# Patient Record
Sex: MALE | Race: OTHER | HISPANIC OR LATINO
[De-identification: names, ages, dates, MRNs, and addresses within clinical notes are randomized per-mention and may not be internally consistent; named-entity substitution may affect disease eponyms.]

---

## 2020-07-07 ENCOUNTER — APPOINTMENT (OUTPATIENT)
Dept: FAMILY MEDICINE | Facility: CLINIC | Age: 40
End: 2020-07-07

## 2022-02-01 ENCOUNTER — APPOINTMENT (OUTPATIENT)
Dept: FAMILY MEDICINE | Facility: CLINIC | Age: 42
End: 2022-02-01
Payer: COMMERCIAL

## 2022-02-01 VITALS
OXYGEN SATURATION: 98 % | TEMPERATURE: 97.9 F | RESPIRATION RATE: 12 BRPM | SYSTOLIC BLOOD PRESSURE: 120 MMHG | BODY MASS INDEX: 26.99 KG/M2 | DIASTOLIC BLOOD PRESSURE: 80 MMHG | HEART RATE: 82 BPM | HEIGHT: 65 IN | WEIGHT: 162 LBS

## 2022-02-01 PROCEDURE — 99386 PREV VISIT NEW AGE 40-64: CPT

## 2022-02-01 NOTE — COUNSELING
[Fall prevention counseling provided] : Fall prevention counseling provided [Adequate lighting] : Adequate lighting [No throw rugs] : No throw rugs [Use proper foot wear] : Use proper foot wear [Behavioral health counseling provided] : Behavioral health counseling provided [Sleep ___ hours/day] : Sleep [unfilled] hours/day [Engage in a relaxing activity] : Engage in a relaxing activity [Plan in advance] : Plan in advance [AUDIT-C Screening administered and reviewed] : AUDIT-C Screening administered and reviewed [Encouraged to increase physical activity] : Encouraged to increase physical activity [____ min/wk Activity] : [unfilled] min/wk activity [None] : None [Good understanding] : Patient has a good understanding of lifestyle changes and steps needed to achieve self management goal

## 2022-02-01 NOTE — HISTORY OF PRESENT ILLNESS
[FreeTextEntry1] : establish care [de-identified] : This is a 43 y/o male with PMHx significant for chronic migraines, GERD, SBO x 2 with 3 EGD with dilatation (GSH X 2 and last in hospital in [place] 2 years ago) presenting to establish as a new patient in the practice and for CPE. Pt was seeing Dr Ibrahim up to a couple of months ago. Pt request medication refills, states that still has headaches daily for which he takes Esgic.

## 2022-02-01 NOTE — HEALTH RISK ASSESSMENT
[Good] : ~his/her~  mood as  good [Never] : Never [No] : In the past 12 months have you used drugs other than those required for medical reasons? No [No falls in past year] : Patient reported no falls in the past year [0] : 2) Feeling down, depressed, or hopeless: Not at all (0) [PHQ-2 Negative - No further assessment needed] : PHQ-2 Negative - No further assessment needed [HIV Test offered] : HIV Test offered [Hepatitis C test offered] : Hepatitis C test offered [None] : None [With Family] : lives with family [# of Members in Household ___] :  household currently consist of [unfilled] member(s) [Employed] : employed [] :  [# Of Children ___] : has [unfilled] children [Sexually Active] : sexually active [Feels Safe at Home] : Feels safe at home [Fully functional (bathing, dressing, toileting, transferring, walking, feeding)] : Fully functional (bathing, dressing, toileting, transferring, walking, feeding) [Fully functional (using the telephone, shopping, preparing meals, housekeeping, doing laundry, using] : Fully functional and needs no help or supervision to perform IADLs (using the telephone, shopping, preparing meals, housekeeping, doing laundry, using transportation, managing medications and managing finances) [Smoke Detector] : smoke detector [Carbon Monoxide Detector] : carbon monoxide detector [Seat Belt] :  uses seat belt [With Patient/Caregiver] : , with patient/caregiver [Aggressive treatment] : aggressive treatment [Audit-CScore] : 0 [de-identified] : none [de-identified] : regular [FDK2Irfkx] : 0 [Change in mental status noted] : No change in mental status noted [Language] : denies difficulty with language [Behavior] : denies difficulty with behavior [Learning/Retaining New Information] : denies difficulty learning/retaining new information [Handling Complex Tasks] : denies difficulty handling complex tasks [Reasoning] : denies difficulty with reasoning [Spatial Ability and Orientation] : denies difficulty with spatial ability and orientation [High Risk Behavior] : no high risk behavior [Reports changes in hearing] : Reports no changes in hearing [Reports changes in vision] : Reports no changes in vision [Reports changes in dental health] : Reports no changes in dental health [Guns at Home] : no guns at home [Sunscreen] : does not use sunscreen [de-identified] : Full time [FreeTextEntry2] : [employer] [AdvancecareDate] : 01/22

## 2022-02-01 NOTE — ASSESSMENT
[FreeTextEntry1] : This is a 43 y/o male with PMHx significant for chronic migraines, GERD, SBO x 2 with 3 EGD with dilatation (GSH X 3 with last performed at a hospital in [place] 2 years ago) presenting to establish as a new patient in the practice and for CPE. \par \par NEURO: Chronic migraines\par -Continue Esgic, I-STOP reviewed, Reference #: [id number], postdated for 2/15/22\par -Neurology referral with Dr Wood\par \par GI: h/o GERD, h/o SBO\par -Resolved, continue dietary changes\par -Pt cannot recall hospital name or GI for records\par \par HCM: \par -Declined Flu vacine\par -Covid vaccine MODERNA 7/28/21, 8/25/21\par -Fasting labs as outpt\par -Hep C / HIV testing verbally consented.\par \par \par \par \par \par \par

## 2022-03-15 LAB
25(OH)D3 SERPL-MCNC: 6.2 NG/ML
ALBUMIN SERPL ELPH-MCNC: 5.1 G/DL
ALP BLD-CCNC: 105 U/L
ALT SERPL-CCNC: 34 U/L
ANION GAP SERPL CALC-SCNC: 16 MMOL/L
APPEARANCE: CLEAR
AST SERPL-CCNC: 30 U/L
BACTERIA: NEGATIVE
BASOPHILS # BLD AUTO: 0.04 K/UL
BASOPHILS NFR BLD AUTO: 0.9 %
BILIRUB SERPL-MCNC: 0.2 MG/DL
BILIRUBIN URINE: NEGATIVE
BLOOD URINE: NEGATIVE
BUN SERPL-MCNC: 12 MG/DL
CALCIUM SERPL-MCNC: 10.3 MG/DL
CHLORIDE SERPL-SCNC: 99 MMOL/L
CHOLEST SERPL-MCNC: 254 MG/DL
CO2 SERPL-SCNC: 24 MMOL/L
COLOR: YELLOW
COMPREHENSIVE SCREEN URINE: NORMAL
CREAT SERPL-MCNC: 0.75 MG/DL
EOSINOPHIL # BLD AUTO: 0.33 K/UL
EOSINOPHIL NFR BLD AUTO: 7.1 %
GLUCOSE QUALITATIVE U: NEGATIVE
GLUCOSE SERPL-MCNC: 102 MG/DL
HCT VFR BLD CALC: 47.9 %
HCV AB SER QL: NONREACTIVE
HCV S/CO RATIO: 0.1 S/CO
HDLC SERPL-MCNC: 94 MG/DL
HGB BLD-MCNC: 15.6 G/DL
HIV1+2 AB SPEC QL IA.RAPID: NONREACTIVE
HYALINE CASTS: 0 /LPF
IMM GRANULOCYTES NFR BLD AUTO: 0.2 %
KETONES URINE: NEGATIVE
LDLC SERPL CALC-MCNC: 131 MG/DL
LEUKOCYTE ESTERASE URINE: NEGATIVE
LYMPHOCYTES # BLD AUTO: 1.94 K/UL
LYMPHOCYTES NFR BLD AUTO: 41.6 %
MAN DIFF?: NORMAL
MCHC RBC-ENTMCNC: 30.8 PG
MCHC RBC-ENTMCNC: 32.6 GM/DL
MCV RBC AUTO: 94.7 FL
MICROSCOPIC-UA: NORMAL
MONOCYTES # BLD AUTO: 0.64 K/UL
MONOCYTES NFR BLD AUTO: 13.7 %
NEUTROPHILS # BLD AUTO: 1.7 K/UL
NEUTROPHILS NFR BLD AUTO: 36.5 %
NITRITE URINE: NEGATIVE
NONHDLC SERPL-MCNC: 160 MG/DL
PH URINE: 6.5
PLATELET # BLD AUTO: 431 K/UL
POTASSIUM SERPL-SCNC: 4.5 MMOL/L
PROT SERPL-MCNC: 7.5 G/DL
PROTEIN URINE: NORMAL
PSA SERPL-MCNC: 0.99 NG/ML
RBC # BLD: 5.06 M/UL
RBC # FLD: 12.3 %
RED BLOOD CELLS URINE: 2 /HPF
SODIUM SERPL-SCNC: 139 MMOL/L
SPECIFIC GRAVITY URINE: 1.03
SQUAMOUS EPITHELIAL CELLS: 0 /HPF
TRIGL SERPL-MCNC: 143 MG/DL
TSH SERPL-ACNC: 1.34 UIU/ML
UROBILINOGEN URINE: NORMAL
WBC # FLD AUTO: 4.66 K/UL
WHITE BLOOD CELLS URINE: 0 /HPF

## 2022-04-12 ENCOUNTER — APPOINTMENT (OUTPATIENT)
Dept: FAMILY MEDICINE | Facility: CLINIC | Age: 42
End: 2022-04-12
Payer: COMMERCIAL

## 2022-04-12 ENCOUNTER — LABORATORY RESULT (OUTPATIENT)
Age: 42
End: 2022-04-12

## 2022-04-12 VITALS
RESPIRATION RATE: 12 BRPM | WEIGHT: 140 LBS | HEIGHT: 65 IN | SYSTOLIC BLOOD PRESSURE: 126 MMHG | OXYGEN SATURATION: 98 % | BODY MASS INDEX: 23.32 KG/M2 | HEART RATE: 85 BPM | TEMPERATURE: 98.2 F | DIASTOLIC BLOOD PRESSURE: 80 MMHG

## 2022-04-12 PROCEDURE — 36415 COLL VENOUS BLD VENIPUNCTURE: CPT

## 2022-04-12 PROCEDURE — 99214 OFFICE O/P EST MOD 30 MIN: CPT | Mod: 25

## 2022-04-12 NOTE — HISTORY OF PRESENT ILLNESS
[FreeTextEntry1] : medication refills. [de-identified] : This is a 41 y/o male with PMHx significant for chronic migraines, GERD, SBO x 2 with 3 EGD with dilatation (GSH X 2 and last in hospital in [place] 2 years ago) presenting to establish as a new patient in the practice and for CPE. Pt was seeing Dr Ibrahim up to a couple of months ago. Pt request medication refills, states that still has headaches daily for which he takes Esgic.

## 2022-04-12 NOTE — ASSESSMENT
[FreeTextEntry1] : This is a 41 y/o male with PMHx significant for chronic migraines, GERD, SBO x 2 with 3 EGD with dilatation (GSH X 3 with last performed at a hospital in [place] 2 years ago) presenting for medication refills.\par \par NEURO: Chronic migraines\par -Continue Esgic, I-STOP reviewed, Reference #: [id number].\par -Neurology referral with Dr Wood, has appointment in 2 days\par \par GI: h/o GERD, h/o SBO\par -Resolved, continue dietary changes\par -Sucralfate prn\par -Pt cannot recall hospital name or GI for records\par \par CVS: HLD\par -Check lipids\par \par F/E/N: Vitamin D Deficiency\par -Continue Vitamin D supplements\par -Check Vitamin D level today in house\par \par HCM: \par -Declined Flu vaccine\par -Covid vaccine MODERNA 7/28/21, 8/25/21\par -Blood drawn in house today\par -Hep C / HIV testing non reactive

## 2022-04-12 NOTE — HEALTH RISK ASSESSMENT
[Never] : Never [No] : In the past 12 months have you used drugs other than those required for medical reasons? No [No falls in past year] : Patient reported no falls in the past year [0] : 2) Feeling down, depressed, or hopeless: Not at all (0) [PHQ-2 Negative - No further assessment needed] : PHQ-2 Negative - No further assessment needed [With Patient/Caregiver] : , with patient/caregiver [Aggressive treatment] : aggressive treatment [Audit-CScore] : 0 [de-identified] : none [de-identified] : regular [[id number]] : 0 [AdvancecareDate] : 01/22

## 2022-04-14 ENCOUNTER — APPOINTMENT (OUTPATIENT)
Dept: NEUROLOGY | Facility: CLINIC | Age: 42
End: 2022-04-14
Payer: COMMERCIAL

## 2022-04-14 VITALS
WEIGHT: 140 LBS | SYSTOLIC BLOOD PRESSURE: 118 MMHG | HEIGHT: 65 IN | BODY MASS INDEX: 23.32 KG/M2 | DIASTOLIC BLOOD PRESSURE: 70 MMHG

## 2022-04-14 PROCEDURE — 99215 OFFICE O/P EST HI 40 MIN: CPT

## 2022-04-14 NOTE — HISTORY OF PRESENT ILLNESS
[FreeTextEntry1] : I saw this patient in the office today.\par \par He had been seen here last in 2014 by Dr. Villatoro.\par He has a long history of headaches that began around 2008.\par They have been daily.\par They wax and wane in intensity but are with him to some degree constantly.\par When severe they are associated with nausea and photophobia.\par \par He has been taking Fioricet with codeine for years.\par He takes 4 to 6 tablets/day.\par \par He had been tried on amitriptyline for prophylaxis, however, this provided no relief.

## 2022-04-14 NOTE — PHYSICAL EXAM
[General Appearance - Alert] : alert [General Appearance - In No Acute Distress] : in no acute distress [Oriented To Time, Place, And Person] : oriented to person, place, and time [Affect] : the affect was normal [Memory Recent] : recent memory was not impaired [Memory Remote] : remote memory was not impaired [Cranial Nerves Optic (II)] : visual acuity intact bilaterally,  visual fields full to confrontation, pupils equal round and reactive to light [Cranial Nerves Oculomotor (III)] : extraocular motion intact [Cranial Nerves Trigeminal (V)] : facial sensation intact symmetrically [Cranial Nerves Facial (VII)] : face symmetrical [Cranial Nerves Vestibulocochlear (VIII)] : hearing was intact bilaterally [Cranial Nerves Glossopharyngeal (IX)] : tongue and palate midline [Cranial Nerves Accessory (XI - Cranial And Spinal)] : head turning and shoulder shrug symmetric [Cranial Nerves Hypoglossal (XII)] : there was no tongue deviation with protrusion [Motor Tone] : muscle tone was normal in all four extremities [Motor Strength] : muscle strength was normal in all four extremities [Sensation Tactile Decrease] : light touch was intact [Sensation Pain / Temperature Decrease] : pain and temperature was intact [Sensation Vibration Decrease] : vibration was intact [Abnormal Walk] : normal gait [2+] : Patella left 2+ [Optic Disc Abnormality] : the optic disc were normal in size and color [Dysarthria] : no dysarthria [Romberg's Sign] : Romberg's sign was negtive [Aphasia] : no dysphasia/aphasia [Coordination - Dysmetria Impaired Finger-to-Nose Bilateral] : not present [Plantar Reflex Right Only] : normal on the right [Plantar Reflex Left Only] : normal on the left

## 2022-04-14 NOTE — ASSESSMENT
[FreeTextEntry1] : This is a 42-year-old man with a long history of headache.\par The description is suggestive of migraine that has evolved into a chronic daily pattern.\par I will repeat an MRI of the brain.\par \par I have explained that there are essentially 2 strategies for dealing with chronic headaches.  The first is abortive medication of which there are numerous over-the-counter preparations as well as prescription options.  The second strategy is prophylactic medications.  I have explained that these are medications which prevent headaches when taken on a regular basis.  I have explained that there are several medications which have been found to be preventative against headaches.  I have further explained that none of the medications have an immediate effect.  They must build up in the system over a few weeks.  Most people notice that within a few weeks on the medication, the headache intensity is diminishing.  Within a few more weeks most people begin to notice that the frequency is decreasing as well.  I have explained that the preventive medications were all originally used for other conditions but were also found to work against chronic headaches.  The patient seemed to understand my explanation.\par \par I explained that Fioricet has a high incidence of rebound headache associated with it.\par It will be impossible to rid him of the headaches while he is taking high doses of Fioricet.  I advised him to taper off it as much as possible.\par I will give him a supply of Relafen to be used instead.\par \par I have suggested a trial of Topamax 50 mg to be taken at bedtime in an attempt to decrease the frequency and intensity of the headaches.\par \par I have discussed the potential side effects of the medication with the patient and have advised the patient to call me should any new symptoms occur.\par \par I have explained that as he has had headaches for many years, it would be unreasonable to expect them to disappear in a few weeks or even in a few months.\par The goal is to improve the headache frequency and ultimately get him off Fioricet completely.\par \par I will see him back in 3 months.

## 2022-04-14 NOTE — CONSULT LETTER
[Dear  ___] : Dear ~DAPHNE, [Courtesy Letter:] : I had the pleasure of seeing your patient, [unfilled], in my office today. [Please see my note below.] : Please see my note below. [Consult Closing:] : Thank you very much for allowing me to participate in the care of this patient.  If you have any questions, please do not hesitate to contact me. [Sincerely,] : Sincerely, [FreeTextEntry3] : Sami Wood MD.

## 2022-05-06 ENCOUNTER — RX CHANGE (OUTPATIENT)
Age: 42
End: 2022-05-06

## 2022-05-06 RX ORDER — TOPIRAMATE 50 MG/1
50 TABLET, FILM COATED ORAL
Qty: 30 | Refills: 3 | Status: DISCONTINUED | COMMUNITY
Start: 2022-04-14 | End: 2022-05-06

## 2022-06-07 ENCOUNTER — APPOINTMENT (OUTPATIENT)
Dept: FAMILY MEDICINE | Facility: CLINIC | Age: 42
End: 2022-06-07
Payer: COMMERCIAL

## 2022-06-07 VITALS
OXYGEN SATURATION: 98 % | WEIGHT: 140 LBS | DIASTOLIC BLOOD PRESSURE: 72 MMHG | HEART RATE: 80 BPM | RESPIRATION RATE: 14 BRPM | SYSTOLIC BLOOD PRESSURE: 120 MMHG | HEIGHT: 65 IN | BODY MASS INDEX: 23.32 KG/M2 | TEMPERATURE: 98.2 F

## 2022-06-07 DIAGNOSIS — Z76.89 PERSONS ENCOUNTERING HEALTH SERVICES IN OTHER SPECIFIED CIRCUMSTANCES: ICD-10-CM

## 2022-06-07 PROCEDURE — 99213 OFFICE O/P EST LOW 20 MIN: CPT

## 2022-06-08 PROBLEM — Z76.89 ENCOUNTER TO ESTABLISH CARE: Status: RESOLVED | Noted: 2022-02-01 | Resolved: 2022-06-08

## 2022-06-08 NOTE — HISTORY OF PRESENT ILLNESS
[FreeTextEntry1] : medication refills. [de-identified] : This is a 43 y/o male with PMHx significant for chronic migraines, GERD, SBO x 2 with 3 EGD with dilatation (GSH X 2 and last in hospital in [place] 2 years ago) presenting requesting medication refills, he was seen by neurology for chronic headaches, started on Topiramate and Relafen

## 2022-06-08 NOTE — HEALTH RISK ASSESSMENT
[Never] : Never [No] : In the past 12 months have you used drugs other than those required for medical reasons? No [No falls in past year] : Patient reported no falls in the past year [0] : 2) Feeling down, depressed, or hopeless: Not at all (0) [PHQ-2 Negative - No further assessment needed] : PHQ-2 Negative - No further assessment needed [With Patient/Caregiver] : , with patient/caregiver [Aggressive treatment] : aggressive treatment [Audit-CScore] : 0 [de-identified] : none [de-identified] : regular [[id number]] : 0 [AdvancecareDate] : 01/22

## 2022-06-08 NOTE — ASSESSMENT
[FreeTextEntry1] : This is a 43 y/o male with PMHx significant for chronic migraines, GERD, SBO x 2 with 3 EGD with dilatation (GSH X 3 with last performed at a hospital in [place] 2 years ago) presenting for medication refills.\par \par NEURO: Chronic migraines\par -States that Topamax gave him worse headaches after taking it 2-3 days, taking Relafen with no improvement\par -Continue Esgic, I-STOP reviewed, Reference #: [id number]\par -Spoke with pt regarding issues with headaches as explained by neurology as well. Recommended to continue Relafen but will start weaning him off the Esgic slowly, will decrease to 115 tabs (120) and will continue to do so gradually\par -Brain MRI as above\par -Seen by Neurology Dr Wood.\par \par GI: h/o GERD, h/o SBO\par -Resolved, continue dietary changes\par -Sucralfate prn\par \par CVS: HLD\par -Lipids with some improvement\par -Continue Rosuvastatin 10 mg\par \par F/E/N: Vitamin D Deficiency\par -Continue Vitamin D supplements\par -Check Vitamin D level today in house\par \par HCM: \par -Declined Flu vaccine\par -Covid vaccine MODERNA 7/28/21, 8/25/21\par -Hep C / HIV testing non reactive

## 2022-07-06 ENCOUNTER — APPOINTMENT (OUTPATIENT)
Dept: NEUROLOGY | Facility: CLINIC | Age: 42
End: 2022-07-06

## 2022-08-04 ENCOUNTER — APPOINTMENT (OUTPATIENT)
Dept: FAMILY MEDICINE | Facility: CLINIC | Age: 42
End: 2022-08-04

## 2022-08-04 VITALS
HEART RATE: 82 BPM | SYSTOLIC BLOOD PRESSURE: 124 MMHG | DIASTOLIC BLOOD PRESSURE: 70 MMHG | WEIGHT: 136 LBS | BODY MASS INDEX: 22.66 KG/M2 | HEIGHT: 65 IN | RESPIRATION RATE: 12 BRPM | TEMPERATURE: 97.2 F | OXYGEN SATURATION: 99 %

## 2022-08-04 DIAGNOSIS — E56.9 VITAMIN DEFICIENCY, UNSPECIFIED: ICD-10-CM

## 2022-08-04 PROCEDURE — 99214 OFFICE O/P EST MOD 30 MIN: CPT

## 2022-08-04 NOTE — CURRENT MEDS
\telephone---10/8  Received:  [facility]  [address]  Phone Number:  [phone] (Call me)   General Message/Vendor Calls     Caller's first and last name:Yazidi(grandmother)       Reason for call:his physical for school the form need to be complete and fax over to the school fax number [phone] school name is [employer]       Callback required yes/no and why:y       Best contact number(s):[phone]       Details to clarify the request:n\a       Ling Herrera     [Takes medication as prescribed] : takes [None] : Patient does not have any barriers to medication adherence

## 2022-08-04 NOTE — HEALTH RISK ASSESSMENT
[Never] : Never [No] : In the past 12 months have you used drugs other than those required for medical reasons? No [No falls in past year] : Patient reported no falls in the past year [0] : 2) Feeling down, depressed, or hopeless: Not at all (0) [PHQ-2 Negative - No further assessment needed] : PHQ-2 Negative - No further assessment needed [With Patient/Caregiver] : , with patient/caregiver [Aggressive treatment] : aggressive treatment [Audit-CScore] : 0 [de-identified] : none [de-identified] : regular [[id number]] : 0 [AdvancecareDate] : 01/22

## 2022-08-04 NOTE — ASSESSMENT
[FreeTextEntry1] : This is a 41 y/o male with PMHx significant for chronic migraines, GERD, SBO x 2 with 3 EGD with dilatation (GSH X 3 with last performed at a hospital in [place] 2 years ago) presenting for medication refills.\par \par NEURO: Chronic migraines\par -States that Topamax gave him worse headaches after taking it 2-3 days, taking Relafen with no improvement\par -Continue Esgic, I-STOP reviewed, Reference #: [id number]\par -Spoke with pt regarding issues with headaches once again as explained by neurology.\par -Continue Relafen and continue to wean him off the Esgic slowly, will continue 114 tabs (120) and will continue to do so gradually\par -Brain MRI as above\par -Seen by Neurology Dr Wood.\par \par GI: h/o GERD, h/o SBO\par -Resolved, continue dietary changes\par -Sucralfate prn\par \par CVS: HLD\par -Lipids with some improvement\par -Continue Rosuvastatin 10 mg\par \par F/E/N: Vitamin D Deficiency\par -Continue Vitamin D supplements, e-refilled\par \par HCM: \par -Declined Flu vaccine\par -Covid vaccine MODERNA 7/28/21, 8/25/21\par -Hep C / HIV testing non reactive

## 2022-08-04 NOTE — HISTORY OF PRESENT ILLNESS
[FreeTextEntry1] : medication refills. [de-identified] : This is a 41 y/o male with PMHx significant for chronic migraines, GERD, SBO x 2 with 3 EGD with dilatation (GSH X 2 and last in hospital in [place] 2 years ago) presenting requesting medication refills, he was seen by neurology for chronic headaches, started on Topiramate and Relafen but states that the Topamax made him have a reaction giving him more headaches.

## 2022-09-25 ENCOUNTER — NON-APPOINTMENT (OUTPATIENT)
Age: 42
End: 2022-09-25

## 2022-09-26 ENCOUNTER — APPOINTMENT (OUTPATIENT)
Dept: FAMILY MEDICINE | Facility: CLINIC | Age: 42
End: 2022-09-26

## 2022-09-26 VITALS
TEMPERATURE: 98.1 F | BODY MASS INDEX: 23.32 KG/M2 | SYSTOLIC BLOOD PRESSURE: 130 MMHG | HEIGHT: 65 IN | OXYGEN SATURATION: 98 % | WEIGHT: 140 LBS | HEART RATE: 93 BPM | RESPIRATION RATE: 16 BRPM | DIASTOLIC BLOOD PRESSURE: 90 MMHG

## 2022-09-26 DIAGNOSIS — Z23 ENCOUNTER FOR IMMUNIZATION: ICD-10-CM

## 2022-09-26 PROCEDURE — 36415 COLL VENOUS BLD VENIPUNCTURE: CPT

## 2022-09-26 PROCEDURE — 99214 OFFICE O/P EST MOD 30 MIN: CPT | Mod: 25

## 2022-09-26 NOTE — ASSESSMENT
[FreeTextEntry1] : This is a 43 y/o male with PMHx significant for chronic migraines, GERD, SBO x 2 with 3 EGD with dilatation (GSH X 3 with last performed at a hospital in [place] 2 years ago) presenting for medication refills.\par \par : Diminished libido, soft erections\par -Check Prolactin and testosterone level.\par \par NEURO: Chronic migraines\par -Continue Esgic, I-STOP reviewed, Reference #: [id number]\par -Spoke with pt regarding issues with headaches once again as explained by neurology.\par -Continue Relafen and continue to wean him off the Esgic slowly, will continue 114 tabs (120) and will continue to do so gradually\par -Brain MRI as above\par -Seen by Neurology Dr Wood.\par \par GI: h/o GERD, h/o SBO\par -Resolved, continue dietary changes\par -Sucralfate prn\par \par CVS: HLD\par -Lipids with some improvement\par -Continue Rosuvastatin 10 mg\par \par F/E/N: Vitamin D Deficiency\par -Continue Vitamin D supplements, e-refilled\par \par HCM: \par -Declined Flu vaccine\par -Covid vaccine MODERNA 7/28/21, 8/25/21\par -Hep C / HIV testing non reactive

## 2022-09-26 NOTE — HEALTH RISK ASSESSMENT
[Never] : Never [No] : In the past 12 months have you used drugs other than those required for medical reasons? No [No falls in past year] : Patient reported no falls in the past year [0] : 2) Feeling down, depressed, or hopeless: Not at all (0) [PHQ-2 Negative - No further assessment needed] : PHQ-2 Negative - No further assessment needed [With Patient/Caregiver] : , with patient/caregiver [Aggressive treatment] : aggressive treatment [Audit-CScore] : 0 [de-identified] : none [de-identified] : regular [[id number]] : 0 [AdvancecareDate] : 01/22

## 2022-09-26 NOTE — HISTORY OF PRESENT ILLNESS
[FreeTextEntry1] : medication refills. [de-identified] : This is a 41 y/o male with PMHx significant for chronic migraines, GERD, SBO x 2 with 3 EGD with dilatation (GSH X 2 and last in hospital in [place] 2 years ago) presenting requesting medication refills, pt complaining of 2 month h/o soft erections and decreased libido.

## 2022-10-03 LAB
PROLACTIN SERPL-MCNC: 26.8 NG/ML
TESTOST SERPL-MCNC: 512 NG/DL

## 2022-11-22 ENCOUNTER — APPOINTMENT (OUTPATIENT)
Dept: FAMILY MEDICINE | Facility: CLINIC | Age: 42
End: 2022-11-22

## 2022-11-22 VITALS
BODY MASS INDEX: 22.99 KG/M2 | HEART RATE: 88 BPM | OXYGEN SATURATION: 98 % | DIASTOLIC BLOOD PRESSURE: 90 MMHG | WEIGHT: 138 LBS | SYSTOLIC BLOOD PRESSURE: 134 MMHG | HEIGHT: 65 IN | RESPIRATION RATE: 16 BRPM | TEMPERATURE: 97.9 F

## 2022-11-22 PROCEDURE — 99214 OFFICE O/P EST MOD 30 MIN: CPT

## 2022-11-22 RX ORDER — NABUMETONE 500 MG/1
500 TABLET, FILM COATED ORAL
Qty: 60 | Refills: 3 | Status: COMPLETED | COMMUNITY
Start: 2022-04-14 | End: 2022-11-22

## 2022-11-22 NOTE — HEALTH RISK ASSESSMENT
[Never] : Never [No] : In the past 12 months have you used drugs other than those required for medical reasons? No [No falls in past year] : Patient reported no falls in the past year [0] : 2) Feeling down, depressed, or hopeless: Not at all (0) [PHQ-2 Negative - No further assessment needed] : PHQ-2 Negative - No further assessment needed [With Patient/Caregiver] : , with patient/caregiver [Aggressive treatment] : aggressive treatment [Audit-CScore] : 0 [de-identified] : none [de-identified] : regular [[id number]] : 0 [AdvancecareDate] : 01/22

## 2022-11-22 NOTE — HISTORY OF PRESENT ILLNESS
[FreeTextEntry1] : medication refills. [de-identified] : This is a 41 y/o male with PMHx significant for chronic migraines, GERD, SBO x 2 with 3 EGD with dilatation (GSH X 2 and last in hospital in [place] 2 years ago) presenting requesting medication refills.

## 2022-11-22 NOTE — ASSESSMENT
[FreeTextEntry1] : This is a 43 y/o male with PMHx significant for chronic migraines, GERD, SBO x 2 with 3 EGD with dilatation (GSH X 3 with last performed at a hospital in [place] 2 years ago) presenting for medication refills.\par \par NEURO: Chronic migraines\par -Continue Esgic, I-STOP reviewed, Reference #: [id number]\par -Spoke with pt regarding issues with headaches once again as explained by neurology.\par -Continue Esgic 116 tabs.\par -Brain MRI as above\par -Seen by Neurology Dr Wood.\par \par GI: h/o GERD, h/o SBO\par -Resolved, continue dietary changes\par -Sucralfate prn, e-refilled\par \par CVS: HLD\par -Lipids with some improvement\par -Continue Rosuvastatin 10 mg\par \par F/E/N: Vitamin D Deficiency\par -Continue Vitamin D supplements.\par \par HCM: \par -Declined Flu vaccine\par -Covid vaccine MODERNA 7/28/21, 8/25/21\par -Hep C / HIV testing non reactive

## 2023-01-17 ENCOUNTER — APPOINTMENT (OUTPATIENT)
Dept: FAMILY MEDICINE | Facility: CLINIC | Age: 43
End: 2023-01-17
Payer: COMMERCIAL

## 2023-01-17 VITALS
SYSTOLIC BLOOD PRESSURE: 132 MMHG | TEMPERATURE: 97.2 F | OXYGEN SATURATION: 98 % | HEIGHT: 65 IN | DIASTOLIC BLOOD PRESSURE: 84 MMHG | HEART RATE: 90 BPM | RESPIRATION RATE: 16 BRPM | WEIGHT: 134 LBS | BODY MASS INDEX: 22.33 KG/M2

## 2023-01-17 PROCEDURE — 99213 OFFICE O/P EST LOW 20 MIN: CPT

## 2023-01-17 NOTE — HEALTH RISK ASSESSMENT
[Never] : Never [No] : In the past 12 months have you used drugs other than those required for medical reasons? No [No falls in past year] : Patient reported no falls in the past year [0] : 2) Feeling down, depressed, or hopeless: Not at all (0) [PHQ-2 Negative - No further assessment needed] : PHQ-2 Negative - No further assessment needed [With Patient/Caregiver] : , with patient/caregiver [Aggressive treatment] : aggressive treatment [Audit-CScore] : 0 [de-identified] : none [de-identified] : regular [[id number]] : 0 [AdvancecareDate] : 01/22

## 2023-01-17 NOTE — ASSESSMENT
[FreeTextEntry1] : This is a 41 y/o male with PMHx significant for chronic migraines, GERD, SBO x 2 with 3 EGD with dilatation (GSH X 3 with last performed at a hospital in [place] 2 years ago) presenting for medication refills.\par \par NEURO: Chronic migraines\par -Continue Esgic, I-STOP reviewed, Reference #: [id number], last dispensed 12/21/22\par -Continue Esgic 116 tabs.\par -Brain MRI as above\par -Seen by Neurology Dr Wood.\par \par GI: h/o GERD, h/o SBO\par -Resolved, continue dietary changes\par -Sucralfate prn.\par \par CVS: HLD\par -Lipids with some improvement\par -Continue Rosuvastatin 10 mg, e-refilled\par \par F/E/N: Vitamin D Deficiency\par -Continue Vitamin D supplements.\par \par HCM: \par -Declined Flu vaccine\par -Covid vaccine MODERNA 7/28/21, 8/25/21\par -Hep C / HIV testing non reactive

## 2023-01-17 NOTE — HISTORY OF PRESENT ILLNESS
[FreeTextEntry1] : medication refills. [de-identified] : This is a 42 y/o male with PMHx significant for chronic migraines, GERD, SBO x 2 with 3 EGD with dilatation (GSH X 2 and last in hospital in [place] 2 years ago) presenting requesting medication refills. Pt offers no new complains

## 2023-03-13 ENCOUNTER — APPOINTMENT (OUTPATIENT)
Dept: FAMILY MEDICINE | Facility: CLINIC | Age: 43
End: 2023-03-13
Payer: COMMERCIAL

## 2023-03-13 VITALS
HEART RATE: 85 BPM | DIASTOLIC BLOOD PRESSURE: 80 MMHG | BODY MASS INDEX: 22.49 KG/M2 | RESPIRATION RATE: 16 BRPM | WEIGHT: 135 LBS | SYSTOLIC BLOOD PRESSURE: 130 MMHG | HEIGHT: 65 IN | OXYGEN SATURATION: 98 % | TEMPERATURE: 98.1 F

## 2023-03-13 PROCEDURE — 99214 OFFICE O/P EST MOD 30 MIN: CPT

## 2023-03-13 RX ORDER — TOPIRAMATE 50 MG/1
50 TABLET, FILM COATED ORAL
Qty: 90 | Refills: 2 | Status: COMPLETED | COMMUNITY
Start: 2022-05-06 | End: 2023-03-13

## 2023-03-13 NOTE — ASSESSMENT
[FreeTextEntry1] : This is a 42 y/o male with PMHx significant for chronic migraines, GERD, SBO x 2 with 3 EGD with dilatation (GSH X 3 with last performed at a hospital in [place] 2 years ago) presenting for medication refills.\par \par NEURO: Chronic migraines\par -Continue Esgic, I-STOP reviewed, Reference #: [id number], last dispensed 2/14/23\par -Continue Esgic 116 tabs.\par -Seen by Neurology Dr Wood.\par \par GI: h/o GERD, h/o SBO\par -Resolved, continue dietary changes\par -Sucralfate prn.\par \par CVS: HLD\par -Lipids with some improvement\par -Continue Rosuvastatin 10 mg\par \par F/E/N: Vitamin D Deficiency\par -Continue Vitamin D supplements.\par \par HCM: \par -Flu vaccine at local pharmacy 2 weeks ago\par -TDaP vaccine reportedly 2 weeks ago\par -Covid vaccine MODERNA 7/28/21, 8/25/21\par -Hep C / HIV testing non reactive

## 2023-03-13 NOTE — HISTORY OF PRESENT ILLNESS
[FreeTextEntry1] : medication refills. [de-identified] : This is a 44 y/o male with PMHx significant for chronic migraines, GERD, SBO x 2 with 3 EGD with dilatation (GSH X 2 and last in hospital in [place] 3 years ago) presenting requesting medication refills.

## 2023-03-13 NOTE — HEALTH RISK ASSESSMENT
[No] : In the past 12 months have you used drugs other than those required for medical reasons? No [No falls in past year] : Patient reported no falls in the past year [0] : 2) Feeling down, depressed, or hopeless: Not at all (0) [PHQ-2 Negative - No further assessment needed] : PHQ-2 Negative - No further assessment needed [With Patient/Caregiver] : , with patient/caregiver [Aggressive treatment] : aggressive treatment [Never] : Never [Audit-CScore] : 0 [de-identified] : none [de-identified] : regular [[id number]] : 0 [AdvancecareDate] : 01/22

## 2023-04-14 ENCOUNTER — APPOINTMENT (OUTPATIENT)
Dept: FAMILY MEDICINE | Facility: CLINIC | Age: 43
End: 2023-04-14
Payer: COMMERCIAL

## 2023-04-14 VITALS
DIASTOLIC BLOOD PRESSURE: 80 MMHG | TEMPERATURE: 98 F | HEIGHT: 65 IN | RESPIRATION RATE: 16 BRPM | BODY MASS INDEX: 22.82 KG/M2 | HEART RATE: 68 BPM | OXYGEN SATURATION: 98 % | WEIGHT: 137 LBS | SYSTOLIC BLOOD PRESSURE: 120 MMHG

## 2023-04-14 DIAGNOSIS — Z00.00 ENCOUNTER FOR GENERAL ADULT MEDICAL EXAMINATION W/OUT ABNORMAL FINDINGS: ICD-10-CM

## 2023-04-14 PROCEDURE — 36415 COLL VENOUS BLD VENIPUNCTURE: CPT

## 2023-04-14 PROCEDURE — 99396 PREV VISIT EST AGE 40-64: CPT | Mod: 25

## 2023-04-14 NOTE — ASSESSMENT
[FreeTextEntry1] : This is a 42 y/o male with PMHx significant for chronic migraines, GERD, SBO x 2 with 3 EGD with dilatation (GSH X 3 with last performed at a hospital in [place] 2 years ago) presenting for medication refills.\par \par NEURO: Chronic migraines\par -Continue Esgic, no Rx generated\par -Continue Esgic 116 tabs.\par -Seen by Neurology Dr Wood.\par \par GI: h/o GERD, h/o SBO\par -Resolved, continue dietary changes\par -Sucralfate prn.\par \par CVS: HLD\par -Check fasting Lipids\par -Continue Rosuvastatin 10 mg\par \par F/E/N: Vitamin D Deficiency\par -Continue Vitamin D supplements.\par \par HCM: \par -Fasting blood work in house\par -Flu vaccine at local pharmacy 3/23\par -TDaP vaccine reportedly 3/23\par -Covid vaccine MODERNA 7/28/21, 8/25/21\par -Hep C / HIV testing non reactive

## 2023-04-14 NOTE — HEALTH RISK ASSESSMENT
[Good] : ~his/her~  mood as  good [No] : In the past 12 months have you used drugs other than those required for medical reasons? No [No falls in past year] : Patient reported no falls in the past year [0] : 2) Feeling down, depressed, or hopeless: Not at all (0) [PHQ-2 Negative - No further assessment needed] : PHQ-2 Negative - No further assessment needed [None] : None [With Family] : lives with family [# of Members in Household ___] :  household currently consist of [unfilled] member(s) [Employed] : employed [] :  [# Of Children ___] : has [unfilled] children [Sexually Active] : sexually active [Feels Safe at Home] : Feels safe at home [Fully functional (bathing, dressing, toileting, transferring, walking, feeding)] : Fully functional (bathing, dressing, toileting, transferring, walking, feeding) [Fully functional (using the telephone, shopping, preparing meals, housekeeping, doing laundry, using] : Fully functional and needs no help or supervision to perform IADLs (using the telephone, shopping, preparing meals, housekeeping, doing laundry, using transportation, managing medications and managing finances) [Smoke Detector] : smoke detector [Carbon Monoxide Detector] : carbon monoxide detector [Seat Belt] :  uses seat belt [With Patient/Caregiver] : , with patient/caregiver [Aggressive treatment] : aggressive treatment [Never] : Never [Reviewed no changes] : Reviewed, no changes [Audit-CScore] : 0 [de-identified] : none [de-identified] : regular [PGE8Bfywf] : 0 [Change in mental status noted] : No change in mental status noted [Language] : denies difficulty with language [Behavior] : denies difficulty with behavior [Learning/Retaining New Information] : denies difficulty learning/retaining new information [Handling Complex Tasks] : denies difficulty handling complex tasks [Reasoning] : denies difficulty with reasoning [Spatial Ability and Orientation] : denies difficulty with spatial ability and orientation [High Risk Behavior] : no high risk behavior [Reports changes in hearing] : Reports no changes in hearing [Reports changes in vision] : Reports no changes in vision [Reports changes in dental health] : Reports no changes in dental health [Guns at Home] : no guns at home [Sunscreen] : does not use sunscreen [MammogramComments] : n/a [PapSmearComments] : n/a [BoneDensityComments] : n/a [ColonoscopyComments] : n/a [HIVDate] : 02/22 [HepatitisCDate] : 02/22 [de-identified] : Full time [FreeTextEntry2] : [employer] [AdvancecareDate] : 04/23

## 2023-04-14 NOTE — HISTORY OF PRESENT ILLNESS
[FreeTextEntry1] : Physical exam [de-identified] : This is a 44 y/o male with PMHx significant for chronic migraines, GERD, SBO x 2 with 3 EGD with dilatation (GSH X 2 and last in hospital in [place] 3 years ago) presenting for physical exam. No new / acute complains.

## 2023-04-25 LAB
ALBUMIN SERPL ELPH-MCNC: 5 G/DL
ALP BLD-CCNC: 102 U/L
ALT SERPL-CCNC: 35 U/L
ANION GAP SERPL CALC-SCNC: 15 MMOL/L
AST SERPL-CCNC: 31 U/L
BASOPHILS # BLD AUTO: 0.04 K/UL
BASOPHILS NFR BLD AUTO: 0.9 %
BILIRUB SERPL-MCNC: 0.4 MG/DL
BUN SERPL-MCNC: 8 MG/DL
CALCIUM SERPL-MCNC: 9.9 MG/DL
CHLORIDE SERPL-SCNC: 100 MMOL/L
CHOLEST SERPL-MCNC: 206 MG/DL
CO2 SERPL-SCNC: 25 MMOL/L
CREAT SERPL-MCNC: 0.7 MG/DL
EGFR: 117 ML/MIN/1.73M2
EOSINOPHIL # BLD AUTO: 0.47 K/UL
EOSINOPHIL NFR BLD AUTO: 10.1 %
GLUCOSE SERPL-MCNC: 62 MG/DL
HCT VFR BLD CALC: 44.6 %
HDLC SERPL-MCNC: 79 MG/DL
HGB BLD-MCNC: 14.8 G/DL
IMM GRANULOCYTES NFR BLD AUTO: 0 %
LDLC SERPL CALC-MCNC: 96 MG/DL
LYMPHOCYTES # BLD AUTO: 2.27 K/UL
LYMPHOCYTES NFR BLD AUTO: 48.9 %
MAN DIFF?: NORMAL
MCHC RBC-ENTMCNC: 30.7 PG
MCHC RBC-ENTMCNC: 33.2 GM/DL
MCV RBC AUTO: 92.5 FL
MONOCYTES # BLD AUTO: 0.6 K/UL
MONOCYTES NFR BLD AUTO: 12.9 %
NEUTROPHILS # BLD AUTO: 1.26 K/UL
NEUTROPHILS NFR BLD AUTO: 27.2 %
NONHDLC SERPL-MCNC: 127 MG/DL
PLATELET # BLD AUTO: 421 K/UL
POTASSIUM SERPL-SCNC: 4.3 MMOL/L
PROLACTIN SERPL-MCNC: 16 NG/ML
PROT SERPL-MCNC: 7.3 G/DL
PSA SERPL-MCNC: 0.95 NG/ML
RBC # BLD: 4.82 M/UL
RBC # FLD: 11.9 %
SODIUM SERPL-SCNC: 140 MMOL/L
TRIGL SERPL-MCNC: 159 MG/DL
TSH SERPL-ACNC: 1.68 UIU/ML
WBC # FLD AUTO: 4.64 K/UL

## 2023-04-27 LAB — COMPREHENSIVE SCREEN URINE: NORMAL

## 2023-05-09 ENCOUNTER — APPOINTMENT (OUTPATIENT)
Dept: FAMILY MEDICINE | Facility: CLINIC | Age: 43
End: 2023-05-09
Payer: COMMERCIAL

## 2023-05-09 VITALS
HEART RATE: 100 BPM | SYSTOLIC BLOOD PRESSURE: 120 MMHG | DIASTOLIC BLOOD PRESSURE: 90 MMHG | RESPIRATION RATE: 14 BRPM | TEMPERATURE: 98.2 F | BODY MASS INDEX: 22.99 KG/M2 | OXYGEN SATURATION: 98 % | HEIGHT: 65 IN | WEIGHT: 138 LBS

## 2023-05-09 PROCEDURE — 99214 OFFICE O/P EST MOD 30 MIN: CPT

## 2023-05-09 NOTE — HEALTH RISK ASSESSMENT
[No] : In the past 12 months have you used drugs other than those required for medical reasons? No [No falls in past year] : Patient reported no falls in the past year [0] : 2) Feeling down, depressed, or hopeless: Not at all (0) [PHQ-2 Negative - No further assessment needed] : PHQ-2 Negative - No further assessment needed [With Patient/Caregiver] : , with patient/caregiver [Reviewed no changes] : Reviewed, no changes [Aggressive treatment] : aggressive treatment [Never] : Never [Audit-CScore] : 0 [de-identified] : none [de-identified] : regular [COL3Egbgg] : 0 [AdvancecareDate] : 04/23

## 2023-05-09 NOTE — HISTORY OF PRESENT ILLNESS
[FreeTextEntry1] : medication refills. [de-identified] : This is a 44 y/o male with PMHx significant for chronic migraines, GERD, SBO x 2 with 3 EGD with dilatation (GSH X 2 and last in hospital in [place] 3 years ago) presenting for medication refills. No new complains

## 2023-05-09 NOTE — ASSESSMENT
[FreeTextEntry1] : This is a 44 y/o male with PMHx significant for chronic migraines, GERD, SBO x 2 with 3 EGD with dilatation (GSH X 3 with last performed at a hospital in [place] 2 years ago) presenting for medication refills.\par \par NEURO: Chronic migraines\par -Continue Esgic, Rx generated, I-STOP reference # [id number]\par -Continue Esgic 120 tabs, next refill will be down to 115 tabs only, patient aware of titrating medication down..\par -Seen by Neurology Dr Wood, per patient his recommendations did not help the headache.\par \par GI: h/o GERD, h/o SBO\par -Resolved, continue dietary changes\par -Sucralfate prn.\par \par ENDO: Elevated Prolactin level\par -Normalized\par \par CVS: HLD\par -Fasting lipids improving\par -Continue Rosuvastatin 10 mg\par \par F/E/N: Vitamin D Deficiency\par -Continue Vitamin D supplements.\par \par HCM: \par -Flu vaccine at local pharmacy reportedly 3/23\par -TDaP vaccine reportedly 3/23\par -Covid vaccine MODERNA 7/28/21, 8/25/21\par -Hep C / HIV testing non reactive

## 2023-07-06 ENCOUNTER — APPOINTMENT (OUTPATIENT)
Dept: FAMILY MEDICINE | Facility: CLINIC | Age: 43
End: 2023-07-06
Payer: COMMERCIAL

## 2023-07-06 VITALS
HEART RATE: 100 BPM | SYSTOLIC BLOOD PRESSURE: 114 MMHG | DIASTOLIC BLOOD PRESSURE: 86 MMHG | RESPIRATION RATE: 14 BRPM | BODY MASS INDEX: 22.66 KG/M2 | OXYGEN SATURATION: 98 % | HEIGHT: 65 IN | WEIGHT: 136 LBS

## 2023-07-06 PROCEDURE — 99214 OFFICE O/P EST MOD 30 MIN: CPT

## 2023-07-06 RX ORDER — PANTOPRAZOLE 40 MG/1
40 TABLET, DELAYED RELEASE ORAL
Qty: 30 | Refills: 0 | Status: ACTIVE | COMMUNITY
Start: 2023-07-06 | End: 1900-01-01

## 2023-07-10 NOTE — HEALTH RISK ASSESSMENT
[No] : In the past 12 months have you used drugs other than those required for medical reasons? No [No falls in past year] : Patient reported no falls in the past year [0] : 2) Feeling down, depressed, or hopeless: Not at all (0) [PHQ-2 Negative - No further assessment needed] : PHQ-2 Negative - No further assessment needed [With Patient/Caregiver] : , with patient/caregiver [Reviewed no changes] : Reviewed, no changes [Aggressive treatment] : aggressive treatment [Never] : Never [Audit-CScore] : 0 [de-identified] : none [de-identified] : regular [JQK4Ryizs] : 0 [AdvancecareDate] : 04/23

## 2023-07-10 NOTE — ASSESSMENT
[FreeTextEntry1] : This is a 42 y/o male with PMHx significant for chronic migraines, GERD, SBO x 2 with 3 EGD with dilatation (GSH X 3 with last performed at a hospital in [place] 2 years ago) presenting for medication refills.\par \par NEURO: Chronic migraines\par -Continue Esgic, Rx generated, I-STOP reviewed\par -Continue Esgic 120 tabs, next refill will be down to 115 tabs only, patient aware of titrating medication down..\par -Seen by Neurology Dr Wood, per patient his recommendations did not help the headache, likely dependence on Fioricet\par \par GI: h/o GERD, h/o SBO\par -Resolved, continue dietary changes\par -Sucralfate prn.\par \par ENDO: Elevated Prolactin level\par -Normalized\par \par CVS: HLD\par -Fasting lipids improving\par -Continue Rosuvastatin 10 mg\par \par F/E/N: Vitamin D Deficiency\par -Continue Vitamin D supplements.\par \par HCM: \par -Flu vaccine at local pharmacy reportedly 3/23\par -TDaP vaccine reportedly 3/23\par -Covid vaccine MODERNA 7/28/21, 8/25/21\par -Hep C / HIV testing non reactive

## 2023-07-10 NOTE — HISTORY OF PRESENT ILLNESS
[FreeTextEntry1] : medication refills. [de-identified] : This is a 44 y/o male with PMHx significant for chronic migraines, GERD, SBO x 2 with 3 EGD with dilatation (GSH X 2 and last in hospital in [place] 3 years ago) presenting for medication refills. No new complains, pt will be flying out of the country to [facility] next week.

## 2023-07-12 ENCOUNTER — RX CHANGE (OUTPATIENT)
Age: 43
End: 2023-07-12

## 2023-09-07 ENCOUNTER — APPOINTMENT (OUTPATIENT)
Dept: FAMILY MEDICINE | Facility: CLINIC | Age: 43
End: 2023-09-07

## 2023-10-26 ENCOUNTER — APPOINTMENT (OUTPATIENT)
Dept: FAMILY MEDICINE | Facility: CLINIC | Age: 43
End: 2023-10-26
Payer: COMMERCIAL

## 2023-10-26 VITALS
WEIGHT: 135 LBS | SYSTOLIC BLOOD PRESSURE: 130 MMHG | OXYGEN SATURATION: 98 % | HEIGHT: 65 IN | HEART RATE: 111 BPM | DIASTOLIC BLOOD PRESSURE: 80 MMHG | RESPIRATION RATE: 14 BRPM | TEMPERATURE: 98 F | BODY MASS INDEX: 22.49 KG/M2

## 2023-10-26 PROCEDURE — 99214 OFFICE O/P EST MOD 30 MIN: CPT

## 2023-10-26 RX ORDER — ERGOCALCIFEROL 1.25 MG/1
1.25 MG CAPSULE, LIQUID FILLED ORAL
Qty: 12 | Refills: 1 | Status: ACTIVE | COMMUNITY
Start: 2022-03-15 | End: 1900-01-01

## 2023-10-26 RX ORDER — ROSUVASTATIN CALCIUM 10 MG/1
10 TABLET, FILM COATED ORAL
Qty: 90 | Refills: 1 | Status: ACTIVE | COMMUNITY
Start: 2022-04-30 | End: 1900-01-01

## 2024-01-18 ENCOUNTER — APPOINTMENT (OUTPATIENT)
Dept: FAMILY MEDICINE | Facility: CLINIC | Age: 44
End: 2024-01-18
Payer: COMMERCIAL

## 2024-01-18 VITALS
HEART RATE: 105 BPM | DIASTOLIC BLOOD PRESSURE: 90 MMHG | HEIGHT: 65 IN | BODY MASS INDEX: 23.16 KG/M2 | SYSTOLIC BLOOD PRESSURE: 120 MMHG | WEIGHT: 139 LBS | OXYGEN SATURATION: 98 % | RESPIRATION RATE: 14 BRPM

## 2024-01-18 PROCEDURE — 99213 OFFICE O/P EST LOW 20 MIN: CPT

## 2024-01-18 RX ORDER — SUCRALFATE 1 G/1
1 TABLET ORAL 4 TIMES DAILY
Qty: 60 | Refills: 3 | Status: ACTIVE | COMMUNITY
Start: 2022-04-12 | End: 1900-01-01

## 2024-01-18 NOTE — HEALTH RISK ASSESSMENT
[No] : In the past 12 months have you used drugs other than those required for medical reasons? No [No falls in past year] : Patient reported no falls in the past year [0] : 2) Feeling down, depressed, or hopeless: Not at all (0) [PHQ-2 Negative - No further assessment needed] : PHQ-2 Negative - No further assessment needed [With Patient/Caregiver] : , with patient/caregiver [Reviewed no changes] : Reviewed, no changes [Aggressive treatment] : aggressive treatment [Never] : Never [Audit-CScore] : 0 [de-identified] : none [de-identified] : regular [[id number]] : 0 [AdvancecareDate] : 04/23

## 2024-01-18 NOTE — ASSESSMENT
[FreeTextEntry1] : 44 y/o male with PMHx significant for chronic migraines, GERD, SBO x 2 with 3 EGD with dilatation (GSH X 3 with last performed at a hospital in [place] 2 years ago) presenting for medication refills.  NEURO: Chronic migraines -Continue Esgic, Rx generated, I-STOP reviewed ref# [id number] -Continue Esgic 120 tabs, next refill will be down to 115 tabs only, patient aware of titrating medication down.. -Seen by Neurology Dr Wood, per patient his recommendations did not help the headache, likely dependence on Fioricet  GI: h/o GERD, h/o SBO -Resolved, continue dietary changes -Pantoprazole 40 mg daily prn.  ENDO: Elevated Prolactin level -Prolactin level Normalized  CVS: HLD -Fasting lipids improving -Continue Rosuvastatin 10 mg, e-refilled  F/E/N: Vitamin D Deficiency -Continue Vitamin D supplements.  HCM: -Flu vaccine declined today -TDaP vaccine reportedly 3/23 -Covid vaccine MODERNA 7/28/21, 8/25/21 -Hep C / HIV testing non reactive.

## 2024-01-18 NOTE — HISTORY OF PRESENT ILLNESS
[FreeTextEntry1] : medication refills. [de-identified] : This is a 42 y/o male with PMHx significant for chronic migraines, GERD, SBO x 2 with 3 EGD with dilatation (GSH X 2 and last in hospital in [place] 3 years ago) presenting for medication refills, no new complains. Pt will be flying to [facility] next week to stay x 2 weeks.

## 2024-02-20 ENCOUNTER — APPOINTMENT (OUTPATIENT)
Dept: FAMILY MEDICINE | Facility: CLINIC | Age: 44
End: 2024-02-20
Payer: COMMERCIAL

## 2024-02-20 VITALS
TEMPERATURE: 98.9 F | BODY MASS INDEX: 22.82 KG/M2 | OXYGEN SATURATION: 96 % | HEIGHT: 65 IN | SYSTOLIC BLOOD PRESSURE: 140 MMHG | RESPIRATION RATE: 14 BRPM | HEART RATE: 84 BPM | DIASTOLIC BLOOD PRESSURE: 94 MMHG | WEIGHT: 137 LBS

## 2024-02-20 DIAGNOSIS — G44.89 OTHER HEADACHE SYNDROME: ICD-10-CM

## 2024-02-20 DIAGNOSIS — R79.89 OTHER SPECIFIED ABNORMAL FINDINGS OF BLOOD CHEMISTRY: ICD-10-CM

## 2024-02-20 DIAGNOSIS — K21.9 GASTRO-ESOPHAGEAL REFLUX DISEASE W/OUT ESOPHAGITIS: ICD-10-CM

## 2024-02-20 DIAGNOSIS — R51.9 HEADACHE, UNSPECIFIED: ICD-10-CM

## 2024-02-20 DIAGNOSIS — E78.5 HYPERLIPIDEMIA, UNSPECIFIED: ICD-10-CM

## 2024-02-20 DIAGNOSIS — R68.82 DECREASED LIBIDO: ICD-10-CM

## 2024-02-20 PROCEDURE — 99214 OFFICE O/P EST MOD 30 MIN: CPT

## 2024-02-20 RX ORDER — BUTALBITAL, ACETAMINOPHEN, CAFFEINE AND CODEINE PHOSPHATE 50; 325; 40; 30 MG/1; MG/1; MG/1; MG/1
50-325-40-30 CAPSULE ORAL
Qty: 120 | Refills: 1 | Status: ACTIVE | COMMUNITY
Start: 2022-02-01 | End: 1900-01-01

## 2024-02-20 NOTE — HEALTH RISK ASSESSMENT
[No] : In the past 12 months have you used drugs other than those required for medical reasons? No [No falls in past year] : Patient reported no falls in the past year [0] : 2) Feeling down, depressed, or hopeless: Not at all (0) [PHQ-2 Negative - No further assessment needed] : PHQ-2 Negative - No further assessment needed [With Patient/Caregiver] : , with patient/caregiver [Reviewed no changes] : Reviewed, no changes [Aggressive treatment] : aggressive treatment [Never] : Never [Audit-CScore] : 0 [de-identified] : none [de-identified] : regular [KRJ2Odnaq] : 0 [AdvancecareDate] : 04/23

## 2024-02-20 NOTE — ASSESSMENT
[FreeTextEntry1] : 42 y/o male with PMHx significant for chronic migraines, GERD, SBO x 2 with 3 EGD with dilatation (GSH X 3 with last performed at a hospital in [place] 2 years ago) presenting for medication refills.  NEURO: Chronic migraines -Continue Esgic, Rx generated, I-STOP reviewed ref# [id number] -Continue Esgic 120 tabs e-prescribed. -Seen by Neurology Dr Wood, per patient his recommendations did not help the headache, likely dependence on Fioricet -After reviewing I-Stop has been noted that the patient has been receiving prescriptions from Dr Ibrahim for 120 tabs of Fioricet and also from me, though at different dates not every 30 days but shorter times. I-STOP was copied into EMR and scanned into system. > Rx written by me 10/26/23 for #120 dispensed 10/31/23 paid Cash > Rx written by Dr Ibrahim 11/19/2023 for #120 tabs and 1 refill dispensed 11/19/23 and 12/17/23 with Insurance. > Rx written by Dr Ibrahim 1/7/24 for #120 tabs and 1 refill dispensed 1/8/24 and 2/5/24 paid cash > Rx written by Dr Tan 1/18/24 for #30 tabs dispensed 1/18/24 paid insurance > Rx written by Dr Tan 1/18/24 for #90 tabs dispensed 1/19/24 paid insurance -Pattern showed more than 1 prescriber and medication too soon to be refilled (last Rx refilled 2/5/24 written by Dr Ibrahim 1/18/24-refill, also patient alternating between insurance and paying cash).  > Between Jan 7 and feb 5 received 3 Rx for #120 tabs each. -Received last Rx today, will no longer Rx Fioricet, will see him for any other medical reasons  GI: h/o GERD, h/o SBO -Resolved, continue dietary changes -Pantoprazole 40 mg daily prn.  ENDO: Elevated Prolactin level -Prolactin level Normalized  CVS: HLD -Fasting lipids improving -Continue Rosuvastatin 10 mg, e-refilled  F/E/N: Vitamin D Deficiency -Continue Vitamin D supplements.  HCM: -Flu vaccine declined. -TDaP vaccine reportedly 3/23 -Covid vaccine MODERNA 7/28/21, 8/25/21 -Hep C / HIV testing non-reactive.    Confidential Drug Utilization Report Search Terms: Cody Carlin, 1980Search Date: 02/20/2024 12:46:13 PM The Drug Utilization Report below displays all of the controlled substance prescriptions, if any, that your patient has filled in the last twelve months. The information displayed on this report is compiled from pharmacy submissions to the Department, and accurately reflects the information as submitted by the pharmacies.  This report was requested by: Wilfredo Ruth | Reference #: [id number]  Practitioner Count: 2 Pharmacy Count: 3 Current Opioid Prescriptions: 1 Current Benzodiazepine Prescriptions: 0 Current Stimulant Prescriptions: 0   Patient Demographic Information (PDI)     PDI	First Name	Last Name	Birth Date	Gender	Street Address	City	State	Zip Code A	Cody Carlin	1980	Male	[address] [facility]	[address] B	Cody Carlin	1980	Male	[address] [place]	[address]  Prescription Information    PDI Filter:   PDI	My Rx	Current Rx	Drug Type	Rx Written	Rx Dispensed	Drug	Quantity	Days Supply	Prescriber Name	Prescriber CRISTIANA # A	N	N	O	11/19/2023	12/17/2023	butalbital-acetaminophen-caffeine-codeine -53-30 mg cp	120	30	Fazal Ibrahim	[id number] Payment Method Insurance Dispenser [id number] A	N	N	O	11/19/2023	11/19/2023	butalbital-acetaminophen-caffeine-codeine -11-30 mg cp	120	30	Fazal Ibrahim	[id number] Payment Method Insurance Dispenser [id number] B	N	Y	O	01/07/2024	02/05/2024	butalbital-acetaminophen-caffeine-codeine -34-30 mg cp	120	30	Fazal Ibrahim	[id number] Payment Method Cash Dispenser [id number] B	N	N	O	01/18/2024	01/19/2024	butalbital-acetaminophen-caffeine-codeine -95-30 mg cp	90	20	Federica Cuba MD	[id number] Payment Method Insurance Dispenser [id number] B	N	N	O	01/18/2024	01/18/2024	butalbital-acetaminophen-caffeine-codeine -67-30 mg cp	30	5	Federica Cuba MD	[id number] Payment Method Insurance Dispenser [id number] B	N	N	O	01/07/2024	01/08/2024	butalbital-acetaminophen-caffeine-codeine -90-30 mg cp	120	30	Fazal Ibrahim	[id number] Payment Method Cash Dispenser [id number] B	Y	N	O	10/26/2023	10/31/2023	butalbital-acetaminophen-caffeine-codeine -22-30 mg cp	120	30	Wilfredo Ruth	[id number] Payment Method Cash Dispenser [id number] B	N	N	O	08/29/2023	10/02/2023	butalbital-acetaminophen-caffeine-codeine -33-30 mg cp	120	30	Fazal Ibrahim	[id number] Payment Method Cash Dispenser [id number] B	N	N	O	08/29/2023	09/03/2023	butalbital-acetaminophen-caffeine-codeine -26-30 mg cp	120	30	Fazal Ibrahim	[id number]

## 2024-02-20 NOTE — HISTORY OF PRESENT ILLNESS
[FreeTextEntry1] : medication refills. [de-identified] : This is a 45 y/o male with PMHx significant for chronic migraines, GERD, SBO x 2 with 3 EGD with dilatation (GSH X 2 and last in hospital in [place] 3 years ago) presenting for medication refills, c/o LEFT sided chest cavity pain after being hit playing soccer 3 weeks ago, now feeling better.

## 2024-07-02 ENCOUNTER — RX RENEWAL (OUTPATIENT)
Age: 44
End: 2024-07-02

## 2025-01-13 ENCOUNTER — EMERGENCY (EMERGENCY)
Facility: HOSPITAL | Age: 45
LOS: 1 days | Discharge: LEFT BEFORE DISCHARGE | End: 2025-01-13
Attending: EMERGENCY MEDICINE
Payer: COMMERCIAL

## 2025-01-13 VITALS
TEMPERATURE: 98 F | HEART RATE: 112 BPM | SYSTOLIC BLOOD PRESSURE: 112 MMHG | DIASTOLIC BLOOD PRESSURE: 64 MMHG | RESPIRATION RATE: 18 BRPM | OXYGEN SATURATION: 96 %

## 2025-01-13 VITALS
SYSTOLIC BLOOD PRESSURE: 150 MMHG | HEART RATE: 73 BPM | TEMPERATURE: 98 F | DIASTOLIC BLOOD PRESSURE: 80 MMHG | OXYGEN SATURATION: 97 % | RESPIRATION RATE: 16 BRPM

## 2025-01-13 LAB
ALBUMIN SERPL ELPH-MCNC: 4.4 G/DL — SIGNIFICANT CHANGE UP (ref 3.3–5.2)
ALP SERPL-CCNC: 100 U/L — SIGNIFICANT CHANGE UP (ref 40–120)
ALT FLD-CCNC: 32 U/L — SIGNIFICANT CHANGE UP
AMPHET UR-MCNC: NEGATIVE — SIGNIFICANT CHANGE UP
ANION GAP SERPL CALC-SCNC: 15 MMOL/L — SIGNIFICANT CHANGE UP (ref 5–17)
APPEARANCE UR: CLEAR — SIGNIFICANT CHANGE UP
AST SERPL-CCNC: 26 U/L — SIGNIFICANT CHANGE UP
BARBITURATES UR SCN-MCNC: NEGATIVE — SIGNIFICANT CHANGE UP
BASOPHILS # BLD AUTO: 0.03 K/UL — SIGNIFICANT CHANGE UP (ref 0–0.2)
BASOPHILS NFR BLD AUTO: 0.3 % — SIGNIFICANT CHANGE UP (ref 0–2)
BENZODIAZ UR-MCNC: NEGATIVE — SIGNIFICANT CHANGE UP
BILIRUB SERPL-MCNC: 0.4 MG/DL — SIGNIFICANT CHANGE UP (ref 0.4–2)
BILIRUB UR-MCNC: NEGATIVE — SIGNIFICANT CHANGE UP
BUN SERPL-MCNC: 8.1 MG/DL — SIGNIFICANT CHANGE UP (ref 8–20)
CALCIUM SERPL-MCNC: 8.9 MG/DL — SIGNIFICANT CHANGE UP (ref 8.4–10.5)
CHLORIDE SERPL-SCNC: 102 MMOL/L — SIGNIFICANT CHANGE UP (ref 96–108)
CO2 SERPL-SCNC: 23 MMOL/L — SIGNIFICANT CHANGE UP (ref 22–29)
COCAINE METAB.OTHER UR-MCNC: NEGATIVE — SIGNIFICANT CHANGE UP
COLOR SPEC: YELLOW — SIGNIFICANT CHANGE UP
CREAT SERPL-MCNC: 0.6 MG/DL — SIGNIFICANT CHANGE UP (ref 0.5–1.3)
DIFF PNL FLD: NEGATIVE — SIGNIFICANT CHANGE UP
EGFR: 121 ML/MIN/1.73M2 — SIGNIFICANT CHANGE UP
EOSINOPHIL # BLD AUTO: 0.24 K/UL — SIGNIFICANT CHANGE UP (ref 0–0.5)
EOSINOPHIL NFR BLD AUTO: 2.1 % — SIGNIFICANT CHANGE UP (ref 0–6)
FENTANYL UR QL SCN: NEGATIVE — SIGNIFICANT CHANGE UP
GAS PNL BLDV: SIGNIFICANT CHANGE UP
GLUCOSE SERPL-MCNC: 104 MG/DL — HIGH (ref 70–99)
GLUCOSE UR QL: NEGATIVE MG/DL — SIGNIFICANT CHANGE UP
HCT VFR BLD CALC: 41.3 % — SIGNIFICANT CHANGE UP (ref 39–50)
HGB BLD-MCNC: 14.7 G/DL — SIGNIFICANT CHANGE UP (ref 13–17)
IMM GRANULOCYTES NFR BLD AUTO: 0.4 % — SIGNIFICANT CHANGE UP (ref 0–0.9)
KETONES UR-MCNC: NEGATIVE MG/DL — SIGNIFICANT CHANGE UP
LEUKOCYTE ESTERASE UR-ACNC: NEGATIVE — SIGNIFICANT CHANGE UP
LYMPHOCYTES # BLD AUTO: 19.4 % — SIGNIFICANT CHANGE UP (ref 13–44)
LYMPHOCYTES # BLD AUTO: 2.2 K/UL — SIGNIFICANT CHANGE UP (ref 1–3.3)
MCHC RBC-ENTMCNC: 30.7 PG — SIGNIFICANT CHANGE UP (ref 27–34)
MCHC RBC-ENTMCNC: 35.6 G/DL — SIGNIFICANT CHANGE UP (ref 32–36)
MCV RBC AUTO: 86.2 FL — SIGNIFICANT CHANGE UP (ref 80–100)
METHADONE UR-MCNC: NEGATIVE — SIGNIFICANT CHANGE UP
MONOCYTES # BLD AUTO: 0.88 K/UL — SIGNIFICANT CHANGE UP (ref 0–0.9)
MONOCYTES NFR BLD AUTO: 7.8 % — SIGNIFICANT CHANGE UP (ref 2–14)
NEUTROPHILS # BLD AUTO: 7.93 K/UL — HIGH (ref 1.8–7.4)
NEUTROPHILS NFR BLD AUTO: 70 % — SIGNIFICANT CHANGE UP (ref 43–77)
NITRITE UR-MCNC: NEGATIVE — SIGNIFICANT CHANGE UP
OPIATES UR-MCNC: NEGATIVE — SIGNIFICANT CHANGE UP
PCP SPEC-MCNC: SIGNIFICANT CHANGE UP
PCP UR-MCNC: NEGATIVE — SIGNIFICANT CHANGE UP
PH UR: 8 — SIGNIFICANT CHANGE UP (ref 5–8)
PLATELET # BLD AUTO: 407 K/UL — HIGH (ref 150–400)
POTASSIUM SERPL-MCNC: 4.1 MMOL/L — SIGNIFICANT CHANGE UP (ref 3.5–5.3)
POTASSIUM SERPL-SCNC: 4.1 MMOL/L — SIGNIFICANT CHANGE UP (ref 3.5–5.3)
PROT SERPL-MCNC: 6.7 G/DL — SIGNIFICANT CHANGE UP (ref 6.6–8.7)
PROT UR-MCNC: NEGATIVE MG/DL — SIGNIFICANT CHANGE UP
RAPID RVP RESULT: SIGNIFICANT CHANGE UP
RBC # BLD: 4.79 M/UL — SIGNIFICANT CHANGE UP (ref 4.2–5.8)
RBC # FLD: 11.2 % — SIGNIFICANT CHANGE UP (ref 10.3–14.5)
SARS-COV-2 RNA SPEC QL NAA+PROBE: SIGNIFICANT CHANGE UP
SODIUM SERPL-SCNC: 139 MMOL/L — SIGNIFICANT CHANGE UP (ref 135–145)
SP GR SPEC: 1.01 — SIGNIFICANT CHANGE UP (ref 1–1.03)
THC UR QL: NEGATIVE — SIGNIFICANT CHANGE UP
UROBILINOGEN FLD QL: 0.2 MG/DL — SIGNIFICANT CHANGE UP (ref 0.2–1)
WBC # BLD: 11.32 K/UL — HIGH (ref 3.8–10.5)
WBC # FLD AUTO: 11.32 K/UL — HIGH (ref 3.8–10.5)

## 2025-01-13 PROCEDURE — 85025 COMPLETE CBC W/AUTO DIFF WBC: CPT

## 2025-01-13 PROCEDURE — 99285 EMERGENCY DEPT VISIT HI MDM: CPT | Mod: 25

## 2025-01-13 PROCEDURE — 0225U NFCT DS DNA&RNA 21 SARSCOV2: CPT

## 2025-01-13 PROCEDURE — 70450 CT HEAD/BRAIN W/O DYE: CPT | Mod: MC

## 2025-01-13 PROCEDURE — 85014 HEMATOCRIT: CPT

## 2025-01-13 PROCEDURE — 84295 ASSAY OF SERUM SODIUM: CPT

## 2025-01-13 PROCEDURE — 82803 BLOOD GASES ANY COMBINATION: CPT

## 2025-01-13 PROCEDURE — 71045 X-RAY EXAM CHEST 1 VIEW: CPT

## 2025-01-13 PROCEDURE — 82330 ASSAY OF CALCIUM: CPT

## 2025-01-13 PROCEDURE — 70450 CT HEAD/BRAIN W/O DYE: CPT | Mod: 26

## 2025-01-13 PROCEDURE — 99284 EMERGENCY DEPT VISIT MOD MDM: CPT

## 2025-01-13 PROCEDURE — 81003 URINALYSIS AUTO W/O SCOPE: CPT

## 2025-01-13 PROCEDURE — 93010 ELECTROCARDIOGRAM REPORT: CPT

## 2025-01-13 PROCEDURE — 82947 ASSAY GLUCOSE BLOOD QUANT: CPT

## 2025-01-13 PROCEDURE — 82435 ASSAY OF BLOOD CHLORIDE: CPT

## 2025-01-13 PROCEDURE — 84132 ASSAY OF SERUM POTASSIUM: CPT

## 2025-01-13 PROCEDURE — 36415 COLL VENOUS BLD VENIPUNCTURE: CPT

## 2025-01-13 PROCEDURE — 85018 HEMOGLOBIN: CPT

## 2025-01-13 PROCEDURE — 80053 COMPREHEN METABOLIC PANEL: CPT

## 2025-01-13 PROCEDURE — 93005 ELECTROCARDIOGRAM TRACING: CPT

## 2025-01-13 PROCEDURE — 71045 X-RAY EXAM CHEST 1 VIEW: CPT | Mod: 26

## 2025-01-13 PROCEDURE — 80307 DRUG TEST PRSMV CHEM ANLYZR: CPT

## 2025-01-13 PROCEDURE — 83605 ASSAY OF LACTIC ACID: CPT

## 2025-01-13 RX ORDER — SODIUM CHLORIDE 9 MG/ML
1000 INJECTION, SOLUTION INTRAMUSCULAR; INTRAVENOUS; SUBCUTANEOUS ONCE
Refills: 0 | Status: COMPLETED | OUTPATIENT
Start: 2025-01-13 | End: 2025-01-13

## 2025-01-13 RX ADMIN — SODIUM CHLORIDE 1000 MILLILITER(S): 9 INJECTION, SOLUTION INTRAMUSCULAR; INTRAVENOUS; SUBCUTANEOUS at 14:25

## 2025-01-13 NOTE — ED PROVIDER NOTE - CONSTITUTIONAL, MLM
On methadone at home, continue   Well appearing, awake, alert, oriented to person, place, time/situation and in no apparent distress. normal...

## 2025-01-13 NOTE — ED ADULT NURSE NOTE - OBJECTIVE STATEMENT
a&Ox4 c/o witnessed syncopal episode @ work "I was carrying a sheet of metal and they said I went unresponsive and fell to my side " pt does not recall events leading to incident. denies cognitive changes/pain/ discomfort in ED. pt does reports URI like symptoms x 5 days. hx HTN but non compliant w/ meds. pt also reports he was placed on "long term prednisone" but does not take it. + head strike , unk further details or events. no collateral information at bedside.  at baseline in ED

## 2025-01-13 NOTE — ED PROVIDER NOTE - PATIENT PORTAL LINK FT
You can access the FollowMyHealth Patient Portal offered by [facility] by registering at the following website: [email or web address]. By joining [facility]’s FollowMyHealth portal, you will also be able to view your health information using other applications (apps) compatible with our system.

## 2025-01-13 NOTE — ED PROVIDER NOTE - CLINICAL SUMMARY MEDICAL DECISION MAKING FREE TEXT BOX
44 yo m presenting to the ED after syncopal fall with questionable seizure, will obtain labs and head CT scan.

## 2025-01-13 NOTE — ED PROVIDER NOTE - OBJECTIVE STATEMENT
pt is 44 yo M presenting to the ER with cc of fall with seizure. pt was at work today, As per his wife his colleagues said he was helping life a piece of metal at work when he fainted, fell back landing on his head and convulsed for about 10 seconds. pt does not remember event. when seen he denies any cp/sob n/v at this time. denies history of seizures. rest of ros-

## 2025-01-13 NOTE — ED PROVIDER NOTE - PROVIDER TOKENS
PROVIDER:[TOKEN:[[id number]],FOLLOWUP:[Routine]],PROVIDER:[TOKEN:[[id number]],FOLLOWUP:[Routine]]

## 2025-01-13 NOTE — ED PROVIDER NOTE - IV ALTEPLASE INCLUSION HIDDEN
show [employer] Pregnancy And Lactation Text: This medication is Pregnancy Category X and contraindicated in pregnancy and in women who may become pregnant. It is unknown if this medication is excreted in breast milk.

## 2025-01-13 NOTE — ED ADULT NURSE NOTE - CAS ED AMA FORM SIGNED YN
Pt discharged against medical advice.  spoke with pt and explained risks and benefits of leaving AMA. Pt verbalized understanding of risks of leaving. Discharge instructions explained, pt verbalized understaning via teachback. Pt ambulated safely off the unit./Yes

## 2025-01-13 NOTE — ED ADULT TRIAGE NOTE - CHIEF COMPLAINT QUOTE
BIBEMS from work with c/o witnessed seizure approx 10 min per coworkers. denies pmhx. 1 episode of emesis.

## 2025-01-13 NOTE — ED PROVIDER NOTE - ATTENDING CONTRIBUTION TO CARE
44 yo m presenting to the ED after syncopal fall with questionable seizure, will obtain labs and head CT scan.    I, Ruben Law, performed the initial face to face bedside interview with this patient regarding history of present illness, review of symptoms and relevant past medical, social and family history.  I completed an independent physical examination.  I was the initial provider who evaluated this patient. I have signed out the follow up of any pending tests (i.e. labs, radiological studies) to the resident  I have communicated the patient’s plan of care and disposition with the resident

## 2025-01-13 NOTE — ED ADULT NURSE REASSESSMENT NOTE - NS ED NURSE REASSESS COMMENT FT1
Pt remains baseline mental status. Respirations even and unlabored. On continuous CM and . Offering no complaints at this time.

## 2025-01-13 NOTE — ED PROVIDER NOTE - PROGRESS NOTE DETAILS
Yue WARREN: The patient has decided to leave against medical advice. The patient has demonstrated concrete thinking/reasoning, has maintained an orderly/reasonable conversation, appears to have intact insight/judgment/reason demonstrating capacity to make decisions. The patient was informed of the need for continued evaluation for presenting symptom and current incomplete evaluation where life threatening decision have not be ruled out. All risks, benefits, and alternatives to the progression of treatment and the potential dangers of leaving including but not limited to permanent disability, injury, and death were discussed with patient verbalizing understanding.  Several attempts were made to convince patient for continued work up, the patient was unable to be convinced to stay. The pt is refusing any  further care and is leaving against medical advice. The patient was instructed that they are welcome to change their decision to leave against medical advice and return to the emergency department at any time and for any reason in order to allow us to render care.

## 2025-01-13 NOTE — ED PROVIDER NOTE - CARE PROVIDER_API CALL
Phillip Andrade  Cardiovascular Disease  [address], [address]  [place] [address]  Phone: [phone]  Fax: [phone]  Follow Up Time: Routine    Sami Wood  Neurology  [address]  [place] [address]  Phone: [phone]  Fax: [phone]  Follow Up Time: Routine

## 2025-01-13 NOTE — ED PROVIDER NOTE - NSFOLLOWUPINSTRUCTIONS_ED_ALL_ED_FT
Por favor, consulte con carter médico de cabecera dentro de los siete días para hablar sobre carter visita aquí hoy.    Por favor, regrese al departamento de emergencias si tiene síntomas nuevos o preocupantes, incluidos, entre otros, fiebre, escalofríos, debilidad, entumecimiento, confusión, dolor en el pecho, dificultad para respirar, dolor abdominal, náuseas, vómitos, diarrea.  ===  El síncope hace referencia a fern afección en la cual fern persona pierde la conciencia temporalmente. También se lo puede conocer saima desmayo. La causa del síncope es fern disminución súbita del flujo de farzana al cerebro. Puede suceder por diversos motivos.    La mayoría de las causas del síncope no son peligrosas. Puede desencadenarse por cosas tales saima pinchazos de agujas, lida farzana, sentir dolor o emociones intensas. Sin embargo, el síncope también puede ser un signo de un problema médico grave, saima fern anomalía cardíaca. Otras causas pueden ser la deshidratación, las migrañas o ofelia medicamentos que bajan la presión arterial. El médico puede hacerle estudios para encontrar el motivo por el cual tiene un síncope.    Ante un desmayo, obtenga ayuda médica de inmediato. Comuníquese con el servicio de emergencias de carter localidad (911 en los [place]).    Siga estas indicaciones en carter casa:  Esté atento a cualquier cambio en los síntomas. Para mantener carter seguridad y ayudar a aliviar nadeen síntomas, tome estas medidas:    Cómo saber cuándo puede estar a punto de desmayarse    Los signos de que alguien está por desmayarse incluyen lo siguiente:  Sentirse mareado, débil, aturdido o saima si la habitación estuviera girando.  Sentir náuseas.  Lida manchas o lida todo chacko o todo ty en el [place] de visión.  Tener la piel fría y húmeda o sentir calor y sudar.  Tener un zumbido en los oídos (acúfenos).  Si comienza a sentir que podría desmayarse, siéntese o recuéstese inmediatamente. Si se sienta, baje la stew y colóquela entre las piernas. Si se recuesta, levante (eleve) los pies por encima del nivel del corazón.  Respire profundamente y de manera continua. Espere hasta que los síntomas hayan desaparecido.  Pídale a alguien que se quede con usted hasta que se sienta estable.  Medicamentos    Use los medicamentos de venta vera y los recetados solamente saima se lo haya indicado el médico.  Si está tomando medicamentos para la presión arterial o para el corazón, póngase de pie lentamente, tómese algunos minutos para permanecer sentado y luego párese. [place] puede reducir los mareos y el riesgo de tener un síncope.  Estilo de josse    No conduzca vehículos, no use maquinarias ni practique deportes hasta que el médico lo autorice.  No natty alcohol.  No consuma ningún producto que contenga nicotina o tabaco. Estos productos incluyen cigarrillos, tabaco para mascar y aparatos de vapeo, saima los cigarrillos electrónicos. Si necesita ayuda para dejar de consumir estos productos, consulte al médico.  Evite saunas y bañeras de hidromasajes.  Indicaciones generales    [place] con el médico acerca de nadeen síntomas. Es posible que deba realizarse estudios para entender la causa del síncope.  Natty suficiente líquido saima para mantener la orina de color amarillo pálido.  Evite permanecer de pie mucho tiempo. Si debe permanecer de pie meaghan mucho tiempo, realice movimientos saima los siguientes:   las piernas.  Cruzar las piernas.  Flexionar y estirar los músculos de las piernas.  Ponerse en cuclillas.  Concurra a todas las visitas de seguimiento. [place] es importante.  Comuníquese con un médico si:  Tiene episodios saima si fuera a desmayarse.  Solicite ayuda de inmediato si:  Se desmaya.  Se golpea la stew o se lesiona después de desmayarse.  Tiene cualquiera de estos síntomas que pueden indicar problemas en el corazón:  Latidos cardíacos acelerados o irregulares (palpitaciones).  Dolor fuera de lo normal en el pecho, el abdomen o la espalda.  Falta de aire.  Tiene fern convulsión.  Siente un dolor de stew intenso.  Se siente confundido.  Tiene problemas de visión.  Tiene debilidad intensa o dificultad para caminar.  Sangra por la boca o el recto, o nadeen heces son de color ty o aspecto alquitranado.  Estos síntomas pueden representar un problema grave que constituye fern emergencia. No espere hasta que los síntomas desaparezcan. Solicite atención médica de inmediato. Comuníquese con el servicio de emergencias de carter localidad (911 en los [place]). No conduzca por nadeen propios medios hasta el hospital.    Resumen  El síncope hace referencia a fern afección en la cual fern persona pierde la conciencia temporalmente. También se lo puede conocer saima desmayo. La causa del síncope es fern disminución súbita del flujo de farzana al cerebro.  Los signos de que puede estar por desmayarse incluyen mareos, sensación de desvanecimiento, náuseas, cambios repentinos en la visión o piel fría y húmeda.  Aunque la mayoría de las causas no implican un peligro, el síncope puede ser un signo de un problema médico grave. Si se desmaya, solicite ayuda de inmediato.  Si comienza a sentir que podría desmayarse, siéntese o recuéstese inmediatamente. Si se sienta, baje la stew y colóquela entre las piernas. Si se recuesta, levante (eleve) los pies por encima del nivel del corazón.